# Patient Record
Sex: FEMALE | Race: WHITE | NOT HISPANIC OR LATINO | Employment: OTHER | URBAN - METROPOLITAN AREA
[De-identification: names, ages, dates, MRNs, and addresses within clinical notes are randomized per-mention and may not be internally consistent; named-entity substitution may affect disease eponyms.]

---

## 2019-04-16 ENCOUNTER — HOSPITAL ENCOUNTER (EMERGENCY)
Facility: HOSPITAL | Age: 84
Discharge: HOME | End: 2019-04-16
Attending: EMERGENCY MEDICINE
Payer: MEDICARE

## 2019-04-16 VITALS
RESPIRATION RATE: 20 BRPM | BODY MASS INDEX: 27.42 KG/M2 | HEART RATE: 92 BPM | HEIGHT: 63 IN | DIASTOLIC BLOOD PRESSURE: 84 MMHG | TEMPERATURE: 98 F | OXYGEN SATURATION: 97 % | SYSTOLIC BLOOD PRESSURE: 191 MMHG | WEIGHT: 154.75 LBS

## 2019-04-16 DIAGNOSIS — I10 HYPERTENSION, UNSPECIFIED TYPE: Primary | ICD-10-CM

## 2019-04-16 DIAGNOSIS — F41.9 ANXIETY: ICD-10-CM

## 2019-04-16 PROCEDURE — 99283 EMERGENCY DEPT VISIT LOW MDM: CPT | Mod: ER

## 2019-04-16 RX ORDER — DICLOFENAC SODIUM 50 MG/1
50 TABLET, DELAYED RELEASE ORAL 2 TIMES DAILY
COMMUNITY

## 2019-04-16 RX ORDER — FOSINOPRIL SODIUM 20 MG/1
20 TABLET ORAL DAILY
COMMUNITY

## 2019-04-16 RX ORDER — METFORMIN HYDROCHLORIDE 500 MG/1
500 TABLET ORAL 2 TIMES DAILY WITH MEALS
COMMUNITY

## 2019-04-16 RX ORDER — LORAZEPAM 0.5 MG/1
0.5 TABLET ORAL EVERY 6 HOURS PRN
COMMUNITY

## 2019-04-16 RX ORDER — ATORVASTATIN CALCIUM 20 MG/1
20 TABLET, FILM COATED ORAL DAILY
COMMUNITY

## 2019-04-17 NOTE — DISCHARGE INSTRUCTIONS
_______________    As discussed, continue your fosinopril 20 mg once a day, use the Ativan as needed as labeled, and follow up with Dr. Mireles regarding your blood pressure in a week or so.  Check it at home 2 or 3 times a week or so, or you can leave the blood pressure checks to your doctor in the office.      ______________

## 2019-04-17 NOTE — ED PROVIDER NOTES
Encounter Date: 4/16/2019       History     Chief Complaint   Patient presents with    Hypertension     pt states elevated b/p since last night . states started on new med diclofenac 2 weeks ago     Generally healthy, active, still mows her on grass.  Just seen by Dr. Mireles for a complete checkup, reports that everything checked out fine.  She has been taking her Monopril 20 mg once a day, and has occasionally used Ativan 0.5 mg for anxiety since the death of her son.  More recently, she has been on diclofenac for about 2 weeks for bilateral knee arthritis, but feels that it may have been raising her blood pressure.  As she continued to be concerned about this, she has been checking her blood pressure more and more, and getting systolic readings ranging from the 130 to 190s.  She has no symptoms other than anxiety, and she has gotten quite upset about some of these readings.  She has now stopped the diclofenac.  She took this morning's dose of Monopril early because of an elevated reading, but has not taken one of the Ativan tablets in many days.  No other complaints.  Denies chest pain, shortness of breath, headache, neurologic symptoms, or other complaints.  Counseled patient and daughter in detail regarding blood pressure control, monitoring, and medications.  She is quite relieved about the discussion, will take one of her own Ativan now, and I have counseled her to continue her current Monopril 20 mg once a day on a regular basis, and limit the frequency with which she is checking her blood pressure at home as it is adding to anxiety and secondary hypertension.  No other complaints    The history is provided by the patient and a relative. No  was used.     Review of patient's allergies indicates:  No Known Allergies  Past Medical History:   Diagnosis Date    Arthritis     Diabetes mellitus     Hypertension      Past Surgical History:   Procedure Laterality Date    APPENDECTOMY       CHOLECYSTECTOMY      TONSILLECTOMY       History reviewed. No pertinent family history.  Social History     Tobacco Use    Smoking status: Never Smoker    Smokeless tobacco: Never Used   Substance Use Topics    Alcohol use: Not Currently    Drug use: Not Currently     Review of Systems   Constitutional: Negative for activity change, fatigue and fever.   HENT: Negative for congestion, ear pain, facial swelling, nosebleeds, sinus pressure and sore throat.    Eyes: Negative for pain, discharge, redness and visual disturbance.   Respiratory: Negative for cough, choking, chest tightness, shortness of breath and wheezing.    Cardiovascular: Negative for chest pain, palpitations and leg swelling.   Gastrointestinal: Negative for abdominal distention, abdominal pain, nausea and vomiting.   Endocrine: Negative for heat intolerance, polydipsia and polyuria.   Genitourinary: Negative for difficulty urinating, dysuria, flank pain, hematuria and urgency.   Musculoskeletal: Negative for back pain, gait problem, joint swelling and myalgias.   Skin: Negative for color change and rash.   Allergic/Immunologic: Negative for environmental allergies and food allergies.   Neurological: Negative for dizziness, weakness, numbness and headaches.   Hematological: Negative for adenopathy. Does not bruise/bleed easily.   Psychiatric/Behavioral: Negative for agitation and behavioral problems. The patient is nervous/anxious.    All other systems reviewed and are negative.      Physical Exam     Initial Vitals [04/16/19 1953]   BP Pulse Resp Temp SpO2   (!) 191/84 92 20 97.9 °F (36.6 °C) 97 %      MAP       --         Physical Exam    Nursing note and vitals reviewed.  Constitutional: She appears well-developed and well-nourished. She is not diaphoretic. No distress.   HENT:   Head: Normocephalic and atraumatic.   Mouth/Throat: No oropharyngeal exudate.   Eyes: Conjunctivae and EOM are normal. Pupils are equal, round, and reactive to light.  Right eye exhibits no discharge. Left eye exhibits no discharge. No scleral icterus.   Neck: Normal range of motion. Neck supple. No thyromegaly present. No tracheal deviation present. No JVD present.   Cardiovascular: Normal rate, regular rhythm, normal heart sounds and intact distal pulses. Exam reveals no gallop and no friction rub.    No murmur heard.  Pulmonary/Chest: Breath sounds normal. No stridor. No respiratory distress. She has no wheezes. She has no rhonchi. She has no rales. She exhibits no tenderness.   Abdominal: Soft. Bowel sounds are normal. She exhibits no distension and no mass. There is no tenderness. There is no rebound and no guarding.   Musculoskeletal: Normal range of motion. She exhibits no edema or tenderness.   Neurological: She is alert and oriented to person, place, and time. She has normal strength.   Skin: Skin is warm and dry. No rash and no abscess noted. No erythema.   Psychiatric: She has a normal mood and affect. Her behavior is normal. Judgment and thought content normal.   Mildly anxious         ED Course   Procedures  Labs Reviewed - No data to display       Imaging Results    None                               Clinical Impression:     1. Hypertension, unspecified type    2. Anxiety         Disposition:   Disposition: Discharged  Condition: Stable                        Ugo Dan MD  04/16/19 2022

## 2019-04-17 NOTE — ED NOTES
Aaox3, skin warm and dry, resp unlabored and even. amb with steady gait and smith . Pt accompanied by family member. Pt a little anxious but states b/p always goes up at

## 2019-12-01 ENCOUNTER — HOSPITAL ENCOUNTER (EMERGENCY)
Facility: HOSPITAL | Age: 84
Discharge: HOME | End: 2019-12-01
Attending: EMERGENCY MEDICINE
Payer: MEDICARE

## 2019-12-01 VITALS
SYSTOLIC BLOOD PRESSURE: 134 MMHG | HEIGHT: 63 IN | TEMPERATURE: 98 F | RESPIRATION RATE: 22 BRPM | OXYGEN SATURATION: 97 % | WEIGHT: 158.31 LBS | BODY MASS INDEX: 28.05 KG/M2 | HEART RATE: 68 BPM | DIASTOLIC BLOOD PRESSURE: 58 MMHG

## 2019-12-01 DIAGNOSIS — I10 HTN (HYPERTENSION): ICD-10-CM

## 2019-12-01 DIAGNOSIS — F41.1 ANXIETY REACTION: Primary | ICD-10-CM

## 2019-12-01 LAB
ALBUMIN SERPL BCP-MCNC: 3.9 G/DL (ref 3.5–5.2)
ALP SERPL-CCNC: 68 U/L (ref 55–135)
ALT SERPL W/O P-5'-P-CCNC: 14 U/L (ref 10–44)
ANION GAP SERPL CALC-SCNC: 8 MMOL/L (ref 8–16)
AST SERPL-CCNC: 17 U/L (ref 10–40)
BASOPHILS # BLD AUTO: 0.01 K/UL (ref 0–0.2)
BASOPHILS NFR BLD: 0.1 % (ref 0–1.9)
BILIRUB SERPL-MCNC: 0.9 MG/DL (ref 0.1–1)
BNP SERPL-MCNC: 87 PG/ML (ref 0–99)
BUN SERPL-MCNC: 26 MG/DL (ref 8–23)
CALCIUM SERPL-MCNC: 8.9 MG/DL (ref 8.7–10.5)
CHLORIDE SERPL-SCNC: 108 MMOL/L (ref 95–110)
CO2 SERPL-SCNC: 24 MMOL/L (ref 23–29)
CREAT SERPL-MCNC: 0.8 MG/DL (ref 0.5–1.4)
DIFFERENTIAL METHOD: ABNORMAL
EOSINOPHIL # BLD AUTO: 0.2 K/UL (ref 0–0.5)
EOSINOPHIL NFR BLD: 2.5 % (ref 0–8)
ERYTHROCYTE [DISTWIDTH] IN BLOOD BY AUTOMATED COUNT: 13.8 % (ref 11.5–14.5)
EST. GFR  (AFRICAN AMERICAN): >60 ML/MIN/1.73 M^2
EST. GFR  (NON AFRICAN AMERICAN): >60 ML/MIN/1.73 M^2
GLUCOSE SERPL-MCNC: 155 MG/DL (ref 70–110)
HCT VFR BLD AUTO: 35.4 % (ref 37–48.5)
HGB BLD-MCNC: 11.1 G/DL (ref 12–16)
IMM GRANULOCYTES # BLD AUTO: 0.01 K/UL (ref 0–0.04)
IMM GRANULOCYTES NFR BLD AUTO: 0.1 % (ref 0–0.5)
LYMPHOCYTES # BLD AUTO: 1.8 K/UL (ref 1–4.8)
LYMPHOCYTES NFR BLD: 22.3 % (ref 18–48)
MCH RBC QN AUTO: 26.7 PG (ref 27–31)
MCHC RBC AUTO-ENTMCNC: 31.4 G/DL (ref 32–36)
MCV RBC AUTO: 85 FL (ref 82–98)
MONOCYTES # BLD AUTO: 0.4 K/UL (ref 0.3–1)
MONOCYTES NFR BLD: 4.4 % (ref 4–15)
NEUTROPHILS # BLD AUTO: 5.6 K/UL (ref 1.8–7.7)
NEUTROPHILS NFR BLD: 70.6 % (ref 38–73)
NRBC BLD-RTO: 0 /100 WBC
PLATELET # BLD AUTO: 259 K/UL (ref 150–350)
PMV BLD AUTO: 10.6 FL (ref 9.2–12.9)
POTASSIUM SERPL-SCNC: 4.2 MMOL/L (ref 3.5–5.1)
PROT SERPL-MCNC: 6.9 G/DL (ref 6–8.4)
RBC # BLD AUTO: 4.15 M/UL (ref 4–5.4)
SODIUM SERPL-SCNC: 140 MMOL/L (ref 136–145)
TROPONIN I SERPL DL<=0.01 NG/ML-MCNC: 0.01 NG/ML (ref 0–0.03)
WBC # BLD AUTO: 7.99 K/UL (ref 3.9–12.7)

## 2019-12-01 PROCEDURE — 85025 COMPLETE CBC W/AUTO DIFF WBC: CPT | Mod: ER

## 2019-12-01 PROCEDURE — 83880 ASSAY OF NATRIURETIC PEPTIDE: CPT | Mod: ER

## 2019-12-01 PROCEDURE — 80053 COMPREHEN METABOLIC PANEL: CPT | Mod: ER

## 2019-12-01 PROCEDURE — 93005 ELECTROCARDIOGRAM TRACING: CPT | Mod: ER

## 2019-12-01 PROCEDURE — 99284 EMERGENCY DEPT VISIT MOD MDM: CPT | Mod: 25,ER

## 2019-12-01 PROCEDURE — 93010 EKG 12-LEAD: ICD-10-PCS | Mod: ,,, | Performed by: NUCLEAR MEDICINE

## 2019-12-01 PROCEDURE — 93010 ELECTROCARDIOGRAM REPORT: CPT | Mod: ,,, | Performed by: NUCLEAR MEDICINE

## 2019-12-01 PROCEDURE — 84484 ASSAY OF TROPONIN QUANT: CPT | Mod: ER

## 2019-12-01 NOTE — ED PROVIDER NOTES
"SCRIBE #1 NOTE: I, Bakari Ghanshyam, am scribing for, and in the presence of, Adrian Brooks MD. I have scribed the entire note.       History     Chief Complaint   Patient presents with    Anxiety     c/o swelling to lower ext     Review of patient's allergies indicates:  No Known Allergies      History of Present Illness     HPI    12/1/2019, 1:40 AM  History obtained from the patient      History of Present Illness: Dacia Henderson is a 84 y.o. female patient who presents to the Emergency Department for evaluation of anxiety which onset gradually 1 hour ago while sleeping. Pt states she awoke from sleep feeling anxious and shaking. Pt states her sx have resolved at this time but came in "to get checked out." No mitigating or exacerbating factors reported. Associated sxs include left foot swelling after taking off her compression stockings. Patient denies any fever, chills, CP, SOB, n/v, and all other sxs at this time. Prior Tx includes ativan. No further complaints or concerns at this time. Pt also reports intermittent muscle spasms over the chest which last for a few seconds at a time for which she was previously evaluated by a cardiologist 2-3 months ago.      Arrival mode: Personal transportation    PCP: Neo Mireles MD        Past Medical History:  Past Medical History:   Diagnosis Date    Arthritis     Diabetes mellitus     Hypertension        Past Surgical History:  Past Surgical History:   Procedure Laterality Date    APPENDECTOMY      CHOLECYSTECTOMY      TONSILLECTOMY           Family History:  History reviewed. No pertinent family history.       Social History:  Social History     Tobacco Use    Smoking status: Never Smoker    Smokeless tobacco: Never Used   Substance and Sexual Activity    Alcohol use: Not Currently    Drug use: Not Currently    Sexual activity: unknown        Review of Systems     Review of Systems   Constitutional: Negative for chills and fever.   HENT: Negative for " sore throat.    Respiratory: Negative for shortness of breath.    Cardiovascular: Negative for chest pain.   Gastrointestinal: Negative for nausea and vomiting.   Genitourinary: Negative for dysuria.   Musculoskeletal: Negative for back pain.   Skin: Negative for rash.   Neurological: Negative for weakness.   Hematological: Does not bruise/bleed easily.   Psychiatric/Behavioral: The patient is nervous/anxious.    All other systems reviewed and are negative.       Physical Exam     Initial Vitals [12/01/19 0134]   BP Pulse Resp Temp SpO2   (!) 203/116 85 20 98.3 °F (36.8 °C) 97 %      MAP       --          Physical Exam  Nursing Notes and Vital Signs Reviewed.  Constitutional: Well-developed and well-nourished. NAD.  Head: Atraumatic. Normocephalic.  Eyes: PERRL. EOM intact. Conjunctivae are not pale. No scleral icterus.  ENT: Mucous membranes are moist. Oropharynx is clear and symmetric.    Neck: Supple. Full ROM. No lymphadenopathy.  Cardiovascular: Regular rate. Regular rhythm. No murmurs, rubs, or gallops. Distal pulses are 2+ and symmetric.  Pulmonary/Chest: No respiratory distress. Clear to auscultation bilaterally. No wheezing or rales.  Abdominal: Soft and non-distended.  There is no tenderness.  No rebound, guarding, or rigidity. Good bowel sounds.  Genitourinary: No CVA tenderness  Musculoskeletal: Moves all extremities. No obvious deformities. No calf tenderness. No edema noted to the left lower leg.  Skin: Warm and dry.  Neurological:  Alert, awake, and appropriate.  Normal speech.  No acute focal neurological deficits are appreciated.  Psychiatric: Normal affect. Good eye contact. Appropriate in content.       ED Course   Procedures  ED Vital Signs:  Vitals:    12/01/19 0134 12/01/19 0316 12/01/19 0347 12/01/19 0416   BP: (!) 203/116 (!) 137/56 133/60 (!) 134/58   Pulse: 85 65 66 68   Resp: 20 20 14 (!) 22   Temp: 98.3 °F (36.8 °C)  98.3 °F (36.8 °C) 97.5 °F (36.4 °C)   TempSrc: Oral  Oral Oral   SpO2:  "97% 97% 96% 97%   Weight: 71.8 kg (158 lb 4.6 oz)      Height: 5' 3" (1.6 m)          Abnormal Lab Results:  Labs Reviewed   CBC W/ AUTO DIFFERENTIAL - Abnormal; Notable for the following components:       Result Value    Hemoglobin 11.1 (*)     Hematocrit 35.4 (*)     Mean Corpuscular Hemoglobin 26.7 (*)     Mean Corpuscular Hemoglobin Conc 31.4 (*)     All other components within normal limits   COMPREHENSIVE METABOLIC PANEL - Abnormal; Notable for the following components:    Glucose 155 (*)     BUN, Bld 26 (*)     All other components within normal limits   B-TYPE NATRIURETIC PEPTIDE   TROPONIN I        All Lab Results:  Results for orders placed or performed during the hospital encounter of 12/01/19   CBC auto differential   Result Value Ref Range    WBC 7.99 3.90 - 12.70 K/uL    RBC 4.15 4.00 - 5.40 M/uL    Hemoglobin 11.1 (L) 12.0 - 16.0 g/dL    Hematocrit 35.4 (L) 37.0 - 48.5 %    Mean Corpuscular Volume 85 82 - 98 fL    Mean Corpuscular Hemoglobin 26.7 (L) 27.0 - 31.0 pg    Mean Corpuscular Hemoglobin Conc 31.4 (L) 32.0 - 36.0 g/dL    RDW 13.8 11.5 - 14.5 %    Platelets 259 150 - 350 K/uL    MPV 10.6 9.2 - 12.9 fL    Immature Granulocytes 0.1 0.0 - 0.5 %    Gran # (ANC) 5.6 1.8 - 7.7 K/uL    Immature Grans (Abs) 0.01 0.00 - 0.04 K/uL    Lymph # 1.8 1.0 - 4.8 K/uL    Mono # 0.4 0.3 - 1.0 K/uL    Eos # 0.2 0.0 - 0.5 K/uL    Baso # 0.01 0.00 - 0.20 K/uL    nRBC 0 0 /100 WBC    Gran% 70.6 38.0 - 73.0 %    Lymph% 22.3 18.0 - 48.0 %    Mono% 4.4 4.0 - 15.0 %    Eosinophil% 2.5 0.0 - 8.0 %    Basophil% 0.1 0.0 - 1.9 %    Differential Method Automated    Comprehensive metabolic panel   Result Value Ref Range    Sodium 140 136 - 145 mmol/L    Potassium 4.2 3.5 - 5.1 mmol/L    Chloride 108 95 - 110 mmol/L    CO2 24 23 - 29 mmol/L    Glucose 155 (H) 70 - 110 mg/dL    BUN, Bld 26 (H) 8 - 23 mg/dL    Creatinine 0.8 0.5 - 1.4 mg/dL    Calcium 8.9 8.7 - 10.5 mg/dL    Total Protein 6.9 6.0 - 8.4 g/dL    Albumin 3.9 3.5 - " 5.2 g/dL    Total Bilirubin 0.9 0.1 - 1.0 mg/dL    Alkaline Phosphatase 68 55 - 135 U/L    AST 17 10 - 40 U/L    ALT 14 10 - 44 U/L    Anion Gap 8 8 - 16 mmol/L    eGFR if African American >60.0 >60 mL/min/1.73 m^2    eGFR if non African American >60.0 >60 mL/min/1.73 m^2   Brain natriuretic peptide   Result Value Ref Range    BNP 87 0 - 99 pg/mL   Troponin I   Result Value Ref Range    Troponin I 0.007 0.000 - 0.026 ng/mL       The EKG was ordered, reviewed, and independently interpreted by the ED provider.  Interpretation time: 0200  Rate: 74 BPM  Rhythm: NSR  Interpretation: T wave abnormality. No STEMI.      The Emergency Provider reviewed the vital signs and test results, which are outlined above.     ED Discussion       4:15 AM: Reassessed pt at this time.  Pt states her condition has improved at this time. Discussed with pt all pertinent ED information and results. Discussed pt dx and plan of tx. Gave pt all f/u and return to the ED instructions. All questions and concerns were addressed at this time. Pt expresses understanding of information and instructions, and is comfortable with plan to discharge. Pt is stable for discharge.    I discussed with patient and/or family/caretaker that evaluation in the ED does not suggest any emergent or life threatening medical conditions requiring immediate intervention beyond what was provided in the ED, and I believe patient is safe for discharge.  Regardless, an unremarkable evaluation in the ED does not preclude the development or presence of a serious of life threatening condition. As such, patient was instructed to return immediately for any worsening or change in current symptoms.    Pre-hypertension/Hypertension: The pt has been informed that they may have pre-hypertension or hypertension based on a blood pressure reading in the ED. I recommend that the pt call the PCP listed on their discharge instructions or a physician of their choice this week to arrange f/u  for further evaluation of possible pre-hypertension or hypertension.        MDM        Medical Decision Making:   Clinical Tests:   Lab Tests: Ordered and Reviewed  Medical Tests: Reviewed and Ordered           ED Medication(s):  Medications - No data to display    Discharge Medication List as of 12/1/2019  4:15 AM           Medication List      ASK your doctor about these medications    atorvastatin 20 MG tablet  Commonly known as:  LIPITOR     diclofenac 50 MG EC tablet  Commonly known as:  VOLTAREN     fosinopril 20 MG tablet  Commonly known as:  MONOPRIL     LORazepam 0.5 MG tablet  Commonly known as:  ATIVAN     metFORMIN 500 MG tablet  Commonly known as:  GLUCOPHAGE            Follow-up Information     Neo Mireles MD In 2 days.    Specialty:  Internal Medicine  Contact information:  [address] [facility] [address] [phone]             [facility].    Specialty:  Emergency Medicine  Why:  As needed, If symptoms worsen  Contact information:  [address]  [facility] [address] [phone]                     Scribe Attestation:   Scribe #1: I performed the above scribed service and the documentation accurately describes the services I performed. I attest to the accuracy of the note.     Attending:   Physician Attestation Statement for Scribe #1: I, Adrian Brooks MD, personally performed the services described in this documentation, as scribed by Bakari Morrissey, in my presence, and it is both accurate and complete.           Clinical Impression       ICD-10-CM ICD-9-CM   1. Anxiety reaction F41.1 300.00   2. HTN (hypertension) I10 401.9       Disposition:   Disposition: Discharged  Condition: Stable         Adrian Brooks MD  12/01/19 0524

## 2019-12-01 NOTE — ED NOTES
Patient is resting comfortably on stretcher with eyes open. Visitor @ bedside. Call light in reach, NAD.